# Patient Record
Sex: FEMALE
[De-identification: names, ages, dates, MRNs, and addresses within clinical notes are randomized per-mention and may not be internally consistent; named-entity substitution may affect disease eponyms.]

---

## 2022-08-05 ENCOUNTER — HOSPITAL ENCOUNTER (EMERGENCY)
Dept: HOSPITAL 97 - ER | Age: 32
Discharge: HOME | End: 2022-08-05
Payer: SELF-PAY

## 2022-08-05 VITALS — DIASTOLIC BLOOD PRESSURE: 52 MMHG | OXYGEN SATURATION: 98 % | SYSTOLIC BLOOD PRESSURE: 146 MMHG

## 2022-08-05 VITALS — TEMPERATURE: 98.4 F

## 2022-08-05 DIAGNOSIS — R00.2: Primary | ICD-10-CM

## 2022-08-05 DIAGNOSIS — R07.89: ICD-10-CM

## 2022-08-05 LAB
BLD SMEAR INTERP: (no result)
BUN BLD-MCNC: 17 MG/DL (ref 7–18)
GIANT PLATELETS BLD QL SMEAR: (no result)
GLUCOSE SERPLBLD-MCNC: 112 MG/DL (ref 74–106)
HCT VFR BLD CALC: 36.9 % (ref 36–45)
LYMPHOCYTES # SPEC AUTO: 2 K/UL (ref 0.7–4.9)
MCV RBC: 90.6 FL (ref 80–100)
MORPHOLOGY BLD-IMP: (no result)
PMV BLD: 11 FL (ref 7.6–11.3)
POTASSIUM SERPL-SCNC: 3.6 MMOL/L (ref 3.5–5.1)
RBC # BLD: 4.07 M/UL (ref 3.86–4.86)
TROPONIN I SERPL HS-MCNC: < 3 PG/ML (ref ?–58.9)

## 2022-08-05 PROCEDURE — 80048 BASIC METABOLIC PNL TOTAL CA: CPT

## 2022-08-05 PROCEDURE — 71045 X-RAY EXAM CHEST 1 VIEW: CPT

## 2022-08-05 PROCEDURE — 84484 ASSAY OF TROPONIN QUANT: CPT

## 2022-08-05 PROCEDURE — 93005 ELECTROCARDIOGRAM TRACING: CPT

## 2022-08-05 PROCEDURE — 99285 EMERGENCY DEPT VISIT HI MDM: CPT

## 2022-08-05 PROCEDURE — 36415 COLL VENOUS BLD VENIPUNCTURE: CPT

## 2022-08-05 PROCEDURE — 85025 COMPLETE CBC W/AUTO DIFF WBC: CPT

## 2022-08-05 NOTE — EDPHYS
Physician Documentation                                                                           

 [facility]                                                                 

Name: Kaylin Trivedi                                                                             

Age: 31 yrs                                                                                       

Sex: Female                                                                                       

: 1990                                                                                   

MRN: [id number]                                                                                   

Arrival Date: 2022                                                                          

Time: 06:59                                                                                       

Account#: [id number]                                                                            

Bed 8                                                                                             

Private MD:                                                                                       

ED Physician Abdirizak Blackwood                                                                       

HPI:                                                                                              

                                                                                             

07:50 This 31 yrs old  Female presents to ER via Ambulatory with complaints of Chest  kdr 

      Pain.                                                                                       

07:50 This 31 yrs old  Female presents to ER via Ambulatory with complaints of Chest  kdr 

      Pain \T\ palpitations.                                                                      

07:50 The patient or guardian reports chest pain that is located primarily in the anterior    kdr 

      chest wall, chest diffusely. The pain does not radiate. Associated signs and symptoms:      

      Pertinent positives: nausea, Pertinent negatives: abdominal pain, diaphoresis,              

      dizziness, headache, lightheadedness, palpitations, recent travel, shortness of breath,     

      vomiting. The chest pain is described as aching, dull, a pressure. Duration: The            

      patient or guardian reports multiple episodes, that are intermittent, that wax and          

      wane, with no pattern. Modifying factors: The symptoms are alleviated by nothing. the       

      symptoms are aggravated by nothing. Severity of pain: At its worst the pain was mild.       

      The patient has not experienced similar symptoms in the past. The patient has not           

      recently seen a physician.                                                                  

                                                                                                  

OB/GYN:                                                                                           

07:14 LMP 2022                                                                           ap3 

                                                                                                  

Historical:                                                                                       

- Allergies:                                                                                      

07:12 No Known Allergies;                                                                     ap3 

- Home Meds:                                                                                      

07:12 None [Active];                                                                          ap3 

- PMHx:                                                                                           

07:12 None;                                                                                   ap3 

- PSHx:                                                                                           

07:12  section;                                                                       ap3 

                                                                                                  

- Immunization history:: Client reports having NOT received the Covid vaccine.                    

- Social history:: Smoking status: Patient denies any tobacco usage or history of.                

                                                                                                  

                                                                                                  

ROS:                                                                                              

07:50 Constitutional: Negative for fever, chills, and weight loss, Eyes: Negative for injury, kdr 

      pain, redness, and discharge, ENT: Negative for injury, pain, and discharge, Neck:          

      Negative for injury, pain, and swelling, Respiratory: Negative for shortness of breath,     

      cough, wheezing, and pleuritic chest pain, Abdomen/GI: Negative for abdominal pain,         

      nausea, vomiting, diarrhea, and constipation, Back: Negative for injury and pain, :       

      Negative for injury, bleeding, discharge, and swelling, MS/Extremity: Negative for          

      injury and deformity, Skin: Negative for injury, rash, and discoloration, Neuro:            

      Negative for headache, weakness, numbness, tingling, and seizure activity. Psych:           

      Negative for depression, anxiety, suicide ideation, homicidal ideation, and                 

      hallucinations, Allergy/Immunology: Negative for hives, rash, and allergies, Endocrine:     

      Negative for neck swelling, polydipsia, polyuria, polyphagia, and marked weight             

      changes, Hematologic/Lymphatic: Negative for swollen nodes, abnormal bleeding, and          

      unusual bruising.                                                                           

07:50 Cardiovascular: Positive for chest pain, palpitations, Negative for edema, orthopnea,       

      paroxysmal nocturnal dyspnea.                                                               

                                                                                                  

Exam:                                                                                             

07:32 ECG was reviewed by the Attending Physician.                                            kdr 

07:50 Constitutional:  This is a well developed, well nourished patient who is awake, alert,  kdr 

      and in no acute distress. Head/Face:  Normocephalic, atraumatic. Eyes:  Pupils equal        

      round and reactive to light, extra-ocular motions intact.  Lids and lashes normal.          

      Conjunctiva and sclera are non-icteric and not injected.  Cornea within normal limits.      

      Periorbital areas with no swelling, redness, or edema. Neck:  Trachea midline, no           

      thyromegaly or masses palpated, and no cervical lymphadenopathy.  Supple, full range of     

      motion without nuchal rigidity, or vertebral point tenderness.  No Meningismus.             

      Chest/axilla:  Normal chest wall appearance and motion.  Nontender with no deformity.       

      No lesions are appreciated. Cardiovascular:  Regular rate and rhythm with a normal S1       

      and S2.  No gallops, murmurs, or rubs.  Normal PMI, no JVD.  No pulse deficits.             

      Respiratory:  Lungs have equal breath sounds bilaterally, clear to auscultation and         

      percussion.  No rales, rhonchi or wheezes noted.  No increased work of breathing, no        

      retractions or nasal flaring. Abdomen/GI:  Soft, non-tender, with normal bowel sounds.      

      No distension or tympany.  No guarding or rebound.  No evidence of tenderness               

      throughout. Back:  No spinal tenderness.  No costovertebral tenderness.  Full range of      

      motion. Skin:  Warm, dry with normal turgor.  Normal color with no rashes, no lesions,      

      and no evidence of cellulitis. MS/ Extremity:  Pulses equal, no cyanosis.                   

      Neurovascular intact.  Full, normal range of motion. Neuro:  Awake and alert, GCS 15,       

      oriented to person, place, time, and situation.  Cranial nerves II-XII grossly intact.      

      Motor strength 5/5 in all extremities.  Sensory grossly intact.  Cerebellar exam            

      normal.  Normal gait. Psych:  Awake, alert, with orientation to person, place and time.     

       Behavior, mood, and affect are within normal limits.                                       

                                                                                                  

Vital Signs:                                                                                      

07:10  / 83; Pulse 78; Resp 15; Temp 98.4; Pulse Ox 100% ; Weight 86.18 kg; Height 5    ap3 

      ft. 3 in. (160.02 cm); Pain 5/10;                                                           

07:19  / 83; Pulse 68; Resp 17; Temp 98.4; Pulse Ox 100% on R/A; Pain 5/10;             tp1 

08:00  / 66; Pulse 62; Resp 17; Pulse Ox 100% ;                                         tp1 

09:00  / 90; Pulse 71; Pulse Ox 100% on R/A;                                            jg9 

09:43  / 52; Pulse 67; Resp 17; Pulse Ox 98% on R/A;                                    tp1 

07:10 Body Mass Index 33.66 (86.18 kg, 160.02 cm)                                             ap3 

                                                                                                  

MDM:                                                                                              

07:50 Data reviewed: vital signs, nurses notes, lab test result(s), EKG, radiologic studies.  kdr 

      Counseling: I had a detailed discussion with the patient and/or guardian regarding: the     

      historical points, exam findings, and any diagnostic results supporting the                 

      discharge/admit diagnosis, lab results, radiology results.                                  

09:52 Patient medically screened.                                                             kdr 

                                                                                                  

                                                                                             

07:19 Order name: Basic Metabolic Panel; Complete Time: 08:40                                 kdr 

                                                                                             

07:19 Order name: CBC with Diff                                                               kdr 

                                                                                             

07:19 Order name: Troponin HS; Complete Time: 08:40                                           kdr 

                                                                                             

07:19 Order name: XRAY Chest (1 view); Complete Time: 08:40                                   kdr 

                                                                                             

08:38 Order name: Troponin High Sensitivity; Complete Time: 09:50                             kdr 

                                                                                             

07:19 Order name: EKG; Complete Time: 07:20                                                   kdr 

                                                                                             

07:19 Order name: Cardiac monitoring; Complete Time: 07:25                                    kdr 

                                                                                             

07:19 Order name: EKG - Nurse/Tech; Complete Time: 07:25                                      kdr 

                                                                                             

07:19 Order name: IV Saline Lock; Complete Time:                                         kdr 

                                                                                             

07:19 Order name: Labs collected and sent; Complete Time:                                kdr 

                                                                                                  

EC:32 Rate is 68 beats/min. Rhythm is irregular, Sinus arrythmia with PACs. IL interval is    kdr 

      normal. QRS interval is normal. QT interval is normal. Clinical impression: NSR w/          

      Non-specific ST/T Changes.                                                                  

                                                                                                  

Administered Medications:                                                                         

09:39 Drug: Lopressor (metoprolol TARTRATE)) 25 mg Route: PO;                                 tp1 

10:00 Follow up: Response: No adverse reaction; Blood pressure is lowered                     jg9 

                                                                                                  

                                                                                                  

Disposition Summary:                                                                              

22 09:52                                                                                    

Discharge Ordered                                                                                 

      Location: Home                                                                          kdr 

      Problem: new                                                                            kdr 

      Symptoms: have improved                                                                 kdr 

      Condition: Stable                                                                       kdr 

      Diagnosis                                                                                   

        - Palpitations                                                                        kdr 

      Followup:                                                                               kdr 

        - With: Private Physician                                                                  

        - When: 2 - 3 days                                                                         

        - Reason: If symptoms return, Further diagnostic work-up, Recheck today's complaints,      

      Continuance of care, Re-evaluation by your physician                                        

      Discharge Instructions:                                                                     

        - Discharge Summary Sheet                                                             kdr 

        - Palpitations, Easy-to-Read                                                          kdr 

      Forms:                                                                                      

        - Medication Reconciliation Form                                                      kdr 

        - Thank You Letter                                                                    kdr 

        - Work release form                                                                   eb  

      Prescriptions:                                                                              

        - metoprolol tartrate 25 mg Oral tablet                                                    

            - take 1 tablet by ORAL route once daily; 5 tablet; Refills: 0, Product Selection kdr 

      Permitted                                                                                   

Signatures:                                                                                       

Dispatcher [employer]                           Abdirizak Parkinson MD MD   kdr                                                  

Christine Gibbs RN                    RN   ap3                                                  

Lisa Simmons RN                     RN   tp1                                                  

Leana Gonsalves RN   jg9                                                  

                                                                                                  

**************************************************************************************************

## 2022-08-05 NOTE — ER
Nurse's Notes                                                                                     

 [facility]                                                                 

Name: Kaylin Trivedi                                                                             

Age: 31 yrs                                                                                       

Sex: Female                                                                                       

: 1990                                                                                   

MRN: [id number]                                                                                   

Arrival Date: 2022                                                                          

Time: 06:59                                                                                       

Account#: [id number]                                                                            

Bed 8                                                                                             

Private MD:                                                                                       

Diagnosis: Palpitations                                                                           

                                                                                                  

Presentation:                                                                                     

                                                                                             

07:10 Chief complaint: Patient states: she has been having chest pain since 3 

      . However patient states that yesterday the pain got worse, and she feels like her      

      heart rate is going up and down when she has the pain. As well as she reports Nausea        

      and shortness of breath. Coronavirus screen: At this time, the client does not indicate     

      any symptoms associated with coronavirus-19. Ebola Screen: No symptoms or risks             

      identified at this time. Initial Sepsis Screen: Does the patient meet any 2 criteria?       

      No. Patient's initial sepsis screen is negative. Does the patient have a suspected          

      source of infection? No. Patient's initial sepsis screen is negative. Risk Assessment:      

      Do you want to hurt yourself or someone else? Patient reports no desire to harm self or     

      others. Onset of symptoms was 2022.                                               

07:10 Method Of Arrival: Ambulatory                                                           ap3 

07:10 Acuity: NATHAN 2                                                                           ap3 

                                                                                                  

Triage Assessment:                                                                                

07:12 General: Appears uncomfortable, Behavior is calm, cooperative, appropriate for age.     ap3 

      Pain: Complains of pain in anterior aspect of left upper chest Pain does not radiate.       

      Pain currently is 5 out of 10 on a pain scale. at worst was 10 out of 10 on a pain          

      scale. Quality of pain is described as heavy, pressure, Pain began gradually, 2-3 days      

      ago. Neuro: Level of Consciousness is awake, alert, obeys commands, Oriented to person,     

      place, time, situation, Appropriate for age Gait is steady, Speech is normal.               

      Cardiovascular: Reports chest pain, nausea. Respiratory: Airway is patent Respiratory       

      effort is even, unlabored, Respiratory pattern is regular, symmetrical. GI: Reports         

      nausea.                                                                                     

                                                                                                  

OB/GYN:                                                                                           

07:14 LMP 2022                                                                           ap3 

                                                                                                  

Historical:                                                                                       

- Allergies:                                                                                      

07:12 No Known Allergies;                                                                     ap3 

- Home Meds:                                                                                      

07:12 None [Active];                                                                          ap3 

- PMHx:                                                                                           

07:12 None;                                                                                   ap3 

- PSHx:                                                                                           

07:12  section;                                                                       ap3 

                                                                                                  

- Immunization history:: Client reports having NOT received the Covid vaccine.                    

- Social history:: Smoking status: Patient denies any tobacco usage or history of.                

                                                                                                  

                                                                                                  

Screenin:14 Abuse screen: Denies threats or abuse. Nutritional screening: No deficits noted.        ap3 

      Tuberculosis screening: No symptoms or risk factors identified.                             

10:12 Fall Risk None identified.                                                              [id number] 

                                                                                                  

Assessment:                                                                                       

07:13 General: Appears in no apparent distress. uncomfortable, Behavior is cooperative,       tp1 

      anxious. Pain: Complains of pain in chest Pain does not radiate. Pain currently is 5        

      out of 10 on a pain scale. at worst was 10 out of 10 on a pain scale. Quality of pain       

      is described as crushing, Pain began 22. Neuro: Level of Consciousness is        

      awake, alert, obeys commands, Oriented to person, place, time, situation, Denies            

      blurred vision headache. Cardiovascular: Heart tones S1 S2 present Capillary refill < 3     

      seconds Patient's skin is warm and dry. Respiratory: Reports shortness of breath since      

      22 with chest pain Airway is patent Respiratory effort is even, unlabored.       

      GI: Reports nausea, vomiting, Patient currently denies diarrhea. : No signs and/or        

      symptoms were reported regarding the genitourinary system. EENT: No signs and/or            

      symptoms were reported regarding the EENT system. Derm: Skin is pink, warm \T\ dry.         

      Musculoskeletal: Circulation, motion, and sensation intact.                                 

08:18 Reassessment: Patient appears in no apparent distress at this time. No changes from     [facility] 

      previously documented assessment. Patient and/or family updated on plan of care and         

      expected duration. Pain level reassessed. Patient is alert, oriented x 3, equal             

      unlabored respirations, skin warm/dry/pink. states pain has decreased, rates pain 3/10.     

08:55 Reassessment: states she feels like her heart rate is increasing. Resting calmly in     1 

      bed, no signs of distress noted, respirations even and unlabored, heart rate 80.            

      reports chest pain /10.                                                                    

09:43 Reassessment: Patient appears in no apparent distress at this time. Patient and/or      tp1 

      family updated on plan of care and expected duration. Pain level reassessed. Patient is     

      alert, oriented x 3, equal unlabored respirations, skin warm/dry/pink. states feeling       

      much better, rates chest pain 1/10, denies SOB.                                             

                                                                                                  

Vital Signs:                                                                                      

07:10  / 83; Pulse 78; Resp 15; Temp 98.4; Pulse Ox 100% ; Weight 86.18 kg; Height 5    ap3 

      ft. 3 in. (160.02 cm); Pain 5/10;                                                           

07:19  / 83; Pulse 68; Resp 17; Temp 98.4; Pulse Ox 100% on R/A; Pain 5/10;             tp1 

08:00  / 66; Pulse 62; Resp 17; Pulse Ox 100% ;                                         tp1 

09:00  / 90; Pulse 71; Pulse Ox 100% on R/A;                                            jg9 

09:43  / 52; Pulse 67; Resp 17; Pulse Ox 98% on R/A;                                    tp1 

07:10 Body Mass Index 33.66 (86.18 kg, 160.02 cm)                                             ap3 

                                                                                                  

ED Course:                                                                                        

06:59 Patient arrived in ED.                                                                  ja2 

07:04 Gopi Biggs, RN is Primary Nurse.                                                     jb4 

07:05 Primary Nurse role handed off by Gopi Biggs, RN                                      vg1 

07:05 Swapna Pa, RN is Primary Nurse.                                                  vg1 

07:12 Abdirizak Blackwood MD is Attending Physician.                                              kdr 

07:12 Triage completed.                                                                       ap3 

07:13 Primary Nurse role handed off by Swapna Pa, BREANNE                                   tp1 

07:13 Lisa Simmons, RN is Primary Nurse.                                                   tp1 

07:14 Arm band placed on right wrist.                                                         ap3 

07:14 Patient has correct armband on for positive identification. Placed in gown. Call light  ap3 

      in reach. Side rails up X 1. Cardiac monitor on. Pulse ox on. NIBP on.                      

07:14 EKG done, by EKG tech.                                                                  ap3 

07:16 Patient maintains SpO2 saturation greater than 95% on room air.                         ap3 

07:28 Inserted saline lock: 20 gauge in right antecubital area, using aseptic technique.      tp1 

      Blood collected.                                                                            

07:46 XRAY Chest (1 view) In Process Unspecified.                                             EDMS

08:47 Initial lab(s) drawn, Repeat lab(s) drawn. sent to lab.                                 tp1 

10:13 No provider procedures requiring assistance completed.                                  jg9 

10:13 IV discontinued.                                                                        jg9 

                                                                                                  

Administered Medications:                                                                         

09:39 Drug: Lopressor (metoprolol TARTRATE)) 25 mg Route: PO;                                 tp1 

10:00 Follow up: Response: No adverse reaction; Blood pressure is lowered                     jg9 

                                                                                                  

                                                                                                  

Medication:                                                                                       

07:19 VIS not applicable for this client.                                                     tp1 

                                                                                                  

Outcome:                                                                                          

09:52 Discharge ordered by MD.                                                                kdr 

10:13 Discharged to home ambulatory.                                                          jg9 

10:13 Condition: stable                                                                           

10:13 Discharge instructions given to patient, Instructed on discharge instructions, follow       

      up and referral plans. Demonstrated understanding of instructions, follow-up care,          

      Prescriptions given X 1.                                                                    

10:14 Patient left the ED.                                                                    jg9 

                                                                                                  

Signatures:                                                                                       

Dispatcher [employer]                           EDMS                                                 

Abdirizak Blackwood MD MD   kdr                                                  

Gopi Biggs, RN                       RN   [email or web address]                                                  

Christine Gibbs, RN                    RN   [email or web address]                                                  

Swapna Pa, RN                    RN   vg1                                                  

Xochitl Zavala Tiffany RN                     RN   tp1                                                  

Leana Gonsalves RN                   RN   jg9                                                  

                                                                                                  

Corrections: (The following items were deleted from the chart)                                    

09:02 08:58 Reassessment: states she feels like her heart rate is increasing. Resting calmly  tp1 

      in bed, no signs of distress noted, respirations even and unlabored, heart rate 80. tp1     

09:03 08:55 Reassessment: states she feels like her heart rate is increasing. Resting calmly  tp1 

      in bed, no signs of distress noted, respirations even and unlabored, heart rate 80. tp1     

10:14 10:00  / 52; Pulse 68bpm; Resp 18bpm; Spontaneous; Pulse Ox 100% RA; jg9          jg9 

                                                                                                  

**************************************************************************************************

## 2022-08-05 NOTE — EKG
Test Date:    2022-08-05               Test Time:    07:03:19

Technician:   OLIVIA                                    

                                                     

MEASUREMENT RESULTS:                                       

Intervals:                                           

Rate:         68                                     

IN:           158                                    

QRSD:         80                                     

QT:           384                                    

QTc:          408                                    

Axis:                                                

P:            59                                     

IN:           158                                    

QRS:          55                                     

T:            55                                     

                                                     

INTERPRETIVE STATEMENTS:                                       

                                                     

Normal sinus rhythm with sinus arrhythmia

Normal ECG

Compared to ECG 07/08/2022 15:21:56

Sinus tachycardia no longer present



Electronically Signed On 08-05-22 15:05:18 CDT by Leif Allen

## 2022-08-05 NOTE — RAD REPORT
EXAM DESCRIPTION:  RAD - Chest Single View - 8/5/2022 7:44 am

 

CLINICAL HISTORY:  CHEST PAIN

Chest pain.

 

COMPARISON:  Chest Single View dated 7/8/2022

 

FINDINGS:  Portable technique limits examination quality.

 

The lungs are grossly clear. The heart is normal in size. No displaced fractures.

 

IMPRESSION:  No acute intrathoracic process suspected.